# Patient Record
Sex: FEMALE | Race: WHITE | NOT HISPANIC OR LATINO | Employment: FULL TIME | URBAN - METROPOLITAN AREA
[De-identification: names, ages, dates, MRNs, and addresses within clinical notes are randomized per-mention and may not be internally consistent; named-entity substitution may affect disease eponyms.]

---

## 2017-03-06 ENCOUNTER — ALLSCRIPTS OFFICE VISIT (OUTPATIENT)
Dept: OTHER | Facility: OTHER | Age: 22
End: 2017-03-06

## 2017-03-07 ENCOUNTER — LAB REQUISITION (OUTPATIENT)
Dept: LAB | Facility: HOSPITAL | Age: 22
End: 2017-03-07
Payer: COMMERCIAL

## 2017-03-07 DIAGNOSIS — Z32.01 ENCOUNTER FOR PREGNANCY TEST, RESULT POSITIVE: ICD-10-CM

## 2017-03-07 LAB
ABO GROUP BLD: NORMAL
B-HCG SERPL-ACNC: ABNORMAL MIU/ML
BLD GP AB SCN SERPL QL: NEGATIVE
PROGEST SERPL-MCNC: 18.3 NG/ML
RH BLD: POSITIVE

## 2017-03-07 PROCEDURE — 84702 CHORIONIC GONADOTROPIN TEST: CPT | Performed by: OBSTETRICS & GYNECOLOGY

## 2017-03-07 PROCEDURE — 84144 ASSAY OF PROGESTERONE: CPT | Performed by: OBSTETRICS & GYNECOLOGY

## 2017-03-07 PROCEDURE — 86901 BLOOD TYPING SEROLOGIC RH(D): CPT | Performed by: OBSTETRICS & GYNECOLOGY

## 2017-03-07 PROCEDURE — 86850 RBC ANTIBODY SCREEN: CPT | Performed by: OBSTETRICS & GYNECOLOGY

## 2017-03-07 PROCEDURE — 86900 BLOOD TYPING SEROLOGIC ABO: CPT | Performed by: OBSTETRICS & GYNECOLOGY

## 2017-03-08 DIAGNOSIS — Z36.9 ENCOUNTER FOR ANTENATAL SCREENING OF MOTHER: ICD-10-CM

## 2017-03-09 ENCOUNTER — GENERIC CONVERSION - ENCOUNTER (OUTPATIENT)
Dept: OTHER | Facility: OTHER | Age: 22
End: 2017-03-09

## 2017-03-16 ENCOUNTER — HOSPITAL ENCOUNTER (OUTPATIENT)
Dept: ULTRASOUND IMAGING | Facility: MEDICAL CENTER | Age: 22
Discharge: HOME | End: 2017-03-16
Payer: COMMERCIAL

## 2017-03-16 ENCOUNTER — GENERIC CONVERSION - ENCOUNTER (OUTPATIENT)
Dept: OTHER | Facility: OTHER | Age: 22
End: 2017-03-16

## 2017-03-16 DIAGNOSIS — Z36.9 ENCOUNTER FOR ANTENATAL SCREENING OF MOTHER: ICD-10-CM

## 2017-03-16 PROCEDURE — 76802 OB US < 14 WKS ADDL FETUS: CPT

## 2017-03-16 PROCEDURE — 76801 OB US < 14 WKS SINGLE FETUS: CPT

## 2017-07-05 ENCOUNTER — ALLSCRIPTS OFFICE VISIT (OUTPATIENT)
Dept: OTHER | Facility: OTHER | Age: 22
End: 2017-07-05

## 2017-07-14 ENCOUNTER — ALLSCRIPTS OFFICE VISIT (OUTPATIENT)
Dept: OTHER | Facility: OTHER | Age: 22
End: 2017-07-14

## 2018-01-10 NOTE — RESULT NOTES
Verified Results  US OB < [address] [place], [place] [id number] 01:24PM Tano Dawn Order Number: [id number]    - Patient Instructions: To schedule this appointment, please contact Central Scheduling at [phone]  Test Name Result Flag Reference   US OB < 14 WEEKS EACH ADDITIONAL GESTATION (Report)     FIRST TRIMESTER OBSTETRIC ULTRASOUND, COMPLETE     INDICATION: Dating and viability  LMP is 1/22/2017 which projects to a gestational age of 9 weeks and 4 days     COMPARISON: None  TECHNIQUE:  Transabdominal ultrasound of the pelvis was performed  Additional transvaginal imaging was then performed to better assess the gestation, myometrial/endometrial architecture and ovarian parenchymal detail  The study includes volumetric    sweeps and traditional still imaging technique  FINDINGS:     A twin live intrauterine gestation is identified  There is a thick membrane between the gestational sacs consistent with diamniotic dichorionic gestation  TWIN A:     Cardiac activity is present  Heart rate of 176 bpm      YOLK SAC: Present and normal in size and appearance  MEAN GESTATIONAL SAC SIZE: 30 mm = 7 weeks 6 days    MEAN CROWN RUMP LENGTH: 17 mm = 8 weeks 1 days    FETAL ANATOMY: Appropriate for gestational age  AMNIOTIC FLUID/SAC SHAPE: Within expected normal range  PLACENTA: Not visualized at this early gestational age  Cardiac activity is present  Heart rate of 171 bpm      YOLK SAC: Present and normal in size and appearance  MEAN GESTATIONAL SAC SIZE: 33 mm = 8 weeks 3 days    MEAN CROWN RUMP LENGTH: 18 mm = 8 weeks 2 days    FETAL ANATOMY: Appropriate for gestational age  AMNIOTIC FLUID/SAC SHAPE: Within expected normal range  PLACENTA: Not visualized at this early gestational age  There is no significant subchorionic fluid collection  UTERUS/ADNEXA:    The uterus and ovaries are within normal limits  The cervix remains closed     No free fluid present  IMPRESSION:     Live diamniotic dichorionic twin gestation  Twin A:  8 weeks 1 days by crown-rump length (range +/- 5 days)  Twin B: 8 weeks 2 days by crown-rump length (range +/- 5 days)  MALA of 10/25/2017       ##sigslh##sigslh       Workstation performed: [id number]     Signed by:   Virginia Garcia MD   3/16/17

## 2018-01-13 VITALS
WEIGHT: 129 LBS | SYSTOLIC BLOOD PRESSURE: 118 MMHG | BODY MASS INDEX: 19.55 KG/M2 | HEIGHT: 68 IN | DIASTOLIC BLOOD PRESSURE: 64 MMHG

## 2018-01-13 VITALS
DIASTOLIC BLOOD PRESSURE: 64 MMHG | BODY MASS INDEX: 18.81 KG/M2 | SYSTOLIC BLOOD PRESSURE: 118 MMHG | HEIGHT: 68 IN | WEIGHT: 124.13 LBS

## 2018-01-13 NOTE — PROGRESS NOTES
Chief Complaint  Pt presents today for a hcg, progesterone and t&s  Active Problems    1  Acne (706 1) (L70 9)   2  Anxiety (300 00) (F41 9)   3  Bacterial vaginosis (616 10,041 9) (N76 0,B96 89)   4  Contraceptive management (V25 9) (Z30 9)   5  Dysmenorrhea (625 3) (N94 6)   6  Menstrual cramps (625 3) (N94 6)   7  Positive urine pregnancy test (V72 42) (Z32 01)   8  Sexually Active Without Practicing 'Safer Sex' (V69 2)    Current Meds   1  Sprintec 28 0 25-35 MG-MCG Oral Tablet; TAKE 1 TABLET DAILY AS DIRECTED; Therapy: 37CQI3919 to (Evaluate:06Apr2017)  Requested for: 95FXY1855; Last   :00HFM0573 Ordered    Allergies    1  Penicillins    Plan  Positive urine pregnancy test    · (1) HCG QUANT; Status:Active - Retrospective By Protocol Authorization; Requested  for:06Mar2017;    · (1) PROGESTERONE; Status:Active - Retrospective By Protocol Authorization; Requested for:06Mar2017;    · (1) TYPE & SCREEN; Status:Active - Retrospective By Protocol Authorization;  Requested  for:06Mar2017;   the patient pregnant or have they been in the last 90 days? : Yes  the patient been transfused in the last 3 months? : No  number of units for surgical date : 0  of Surgery : [id number]    Future Appointments    Date/Time Provider Specialty Site   03/08/2017 02:15 PM [facility], Nurse Schedule  [facility] [address]     Signatures   Electronically signed by : JOSÉ Carrasquillo ; Mar  6 2017  4:53PM EST

## 2018-01-16 NOTE — RESULT NOTES
Verified Results  (1) HCG QUANT [id number] 01:08PM Carmen Jessicatye Boonemaxi     Test Name Result Flag Reference   HCG QUANTITATIVE 74682 mIU/mL H <=6   Expected Ranges:     Approximate               Approximate HCG  Gestation age          Concentration ( mIU/mL)  _____________          ______________________   Michael Ou                      HCG values  0 2-1                       5-50  1-2                           2-3                         100-5000  3-4                         500-75643  4-5                         1000-39923  5-6                         76172-379346  6-8                         [id number]  8-12                        52702-685644     (1) PROGESTERONE [id number] 01:08PM Brenna Morales     Test Name Result Flag Reference   PROGESTERONE 18 30 ng/mL     Patients taking DHEA-S may have falsely elevated Progesterone levels  Recommend ordering Progesterone, Lab [facility] [id number] to be done by Stephanie  PROGESTERONE:     Serum progesterone 5-25 ng/mL should not be the sole determinant in excluding pregnancy  Menstruating Females:    Follicular phase 6 383-3 80 ng/mL   Luteal phase 2 25-24 2 ng/mL   Mid-luteal phase 8 76-21 6 ng/mL   Postmenopausal Females <0 200-0 901 ng/mL     Pregnant Females:   First trimester of pregnancy 11 4-41 0 ng/mL   Second trimester of pregnancy 13 8-156 ng/mL   Third trimester of pregnancy 51 4->200 ng/mL     (1) TYPE & SCREEN [id number] 01:08PM Brenna Morales     Test Name Result Flag Reference   ABO GROUPING A     RH FACTOR Positive     ANTIBODY SCREEN Negative

## 2018-03-07 NOTE — PROGRESS NOTES
Message   Recorded as Task   Date: 2017 06:22 PM, Created By: Duane Bihari   Task Name: Call Patient with results   Assigned To: Brenna Morales   Regarding Patient: Jerome Woodward, Status: Active   CommentLavonia Fore - 16 Mar 2017 6:22 PM     Patient Phone: [phone]    twins seen on ultrasound  please set up ob ed and consult  center  [facility] - 17 Mar 2017 9:54 AM     TASK REPLIED TO: Previously Assigned To [facility],Team                      lmovm to cb  please ask pt what times are best for her and days so I can call and schedule mfm appt  next available ob ed is 3/31 with [facility] [place] - 17 Mar 2017 11:45 AM     TASK REPLIED TO: Previously Assigned To [facility],Team  patient called back and she is decided to terminate the pregnany  she said she will no longer need any further appointments   thank you     Signatures   Electronically signed by : JOSÉ Sofia ; Mar 17 2017 12:14PM EST                       (Author)